# Patient Record
Sex: MALE | Race: WHITE | NOT HISPANIC OR LATINO | Employment: OTHER | URBAN - METROPOLITAN AREA
[De-identification: names, ages, dates, MRNs, and addresses within clinical notes are randomized per-mention and may not be internally consistent; named-entity substitution may affect disease eponyms.]

---

## 2022-09-12 ENCOUNTER — APPOINTMENT (OUTPATIENT)
Dept: CT IMAGING | Facility: CLINIC | Age: 67
End: 2022-09-12
Attending: NURSE PRACTITIONER
Payer: COMMERCIAL

## 2022-09-12 ENCOUNTER — HOSPITAL ENCOUNTER (EMERGENCY)
Facility: CLINIC | Age: 67
Discharge: HOME | End: 2022-09-12
Attending: NURSE PRACTITIONER | Admitting: NURSE PRACTITIONER
Payer: COMMERCIAL

## 2022-09-12 VITALS
TEMPERATURE: 98.4 F | RESPIRATION RATE: 20 BRPM | OXYGEN SATURATION: 94 % | DIASTOLIC BLOOD PRESSURE: 69 MMHG | HEART RATE: 58 BPM | WEIGHT: 300 LBS | SYSTOLIC BLOOD PRESSURE: 109 MMHG

## 2022-09-12 DIAGNOSIS — K61.1 PERIRECTAL ABSCESS: ICD-10-CM

## 2022-09-12 LAB
ANION GAP SERPL CALCULATED.3IONS-SCNC: 11 MMOL/L (ref 7–15)
BASOPHILS # BLD AUTO: 0.1 10E3/UL (ref 0–0.2)
BASOPHILS NFR BLD AUTO: 1 %
BUN SERPL-MCNC: 15.6 MG/DL (ref 8–23)
CALCIUM SERPL-MCNC: 9.6 MG/DL (ref 8.8–10.2)
CHLORIDE SERPL-SCNC: 99 MMOL/L (ref 98–107)
CREAT SERPL-MCNC: 0.96 MG/DL (ref 0.67–1.17)
DEPRECATED HCO3 PLAS-SCNC: 27 MMOL/L (ref 22–29)
EOSINOPHIL # BLD AUTO: 0.2 10E3/UL (ref 0–0.7)
EOSINOPHIL NFR BLD AUTO: 2 %
ERYTHROCYTE [DISTWIDTH] IN BLOOD BY AUTOMATED COUNT: 14.3 % (ref 10–15)
GFR SERPL CREATININE-BSD FRML MDRD: 87 ML/MIN/1.73M2
GLUCOSE SERPL-MCNC: 88 MG/DL (ref 70–99)
HCT VFR BLD AUTO: 52.3 % (ref 40–53)
HGB BLD-MCNC: 16.7 G/DL (ref 13.3–17.7)
IMM GRANULOCYTES # BLD: 0.1 10E3/UL
IMM GRANULOCYTES NFR BLD: 1 %
LYMPHOCYTES # BLD AUTO: 1.5 10E3/UL (ref 0.8–5.3)
LYMPHOCYTES NFR BLD AUTO: 13 %
MCH RBC QN AUTO: 29.9 PG (ref 26.5–33)
MCHC RBC AUTO-ENTMCNC: 31.9 G/DL (ref 31.5–36.5)
MCV RBC AUTO: 94 FL (ref 78–100)
MONOCYTES # BLD AUTO: 0.9 10E3/UL (ref 0–1.3)
MONOCYTES NFR BLD AUTO: 7 %
NEUTROPHILS # BLD AUTO: 9 10E3/UL (ref 1.6–8.3)
NEUTROPHILS NFR BLD AUTO: 76 %
NRBC # BLD AUTO: 0 10E3/UL
NRBC BLD AUTO-RTO: 0 /100
PLATELET # BLD AUTO: 227 10E3/UL (ref 150–450)
POTASSIUM SERPL-SCNC: 4.6 MMOL/L (ref 3.4–5.3)
RBC # BLD AUTO: 5.58 10E6/UL (ref 4.4–5.9)
SODIUM SERPL-SCNC: 137 MMOL/L (ref 136–145)
WBC # BLD AUTO: 11.8 10E3/UL (ref 4–11)

## 2022-09-12 PROCEDURE — 87077 CULTURE AEROBIC IDENTIFY: CPT | Performed by: NURSE PRACTITIONER

## 2022-09-12 PROCEDURE — 99284 EMERGENCY DEPT VISIT MOD MDM: CPT | Mod: 25 | Performed by: NURSE PRACTITIONER

## 2022-09-12 PROCEDURE — 85004 AUTOMATED DIFF WBC COUNT: CPT | Performed by: NURSE PRACTITIONER

## 2022-09-12 PROCEDURE — 10060 I&D ABSCESS SIMPLE/SINGLE: CPT | Performed by: NURSE PRACTITIONER

## 2022-09-12 PROCEDURE — 72193 CT PELVIS W/DYE: CPT

## 2022-09-12 PROCEDURE — 250N000011 HC RX IP 250 OP 636: Performed by: NURSE PRACTITIONER

## 2022-09-12 PROCEDURE — 87070 CULTURE OTHR SPECIMN AEROBIC: CPT | Performed by: NURSE PRACTITIONER

## 2022-09-12 PROCEDURE — 250N000009 HC RX 250: Performed by: NURSE PRACTITIONER

## 2022-09-12 PROCEDURE — 36415 COLL VENOUS BLD VENIPUNCTURE: CPT | Performed by: NURSE PRACTITIONER

## 2022-09-12 PROCEDURE — 82310 ASSAY OF CALCIUM: CPT | Performed by: NURSE PRACTITIONER

## 2022-09-12 PROCEDURE — 99285 EMERGENCY DEPT VISIT HI MDM: CPT | Mod: 25 | Performed by: NURSE PRACTITIONER

## 2022-09-12 RX ORDER — IOPAMIDOL 755 MG/ML
100 INJECTION, SOLUTION INTRAVASCULAR ONCE
Status: COMPLETED | OUTPATIENT
Start: 2022-09-12 | End: 2022-09-12

## 2022-09-12 RX ORDER — OXYCODONE HYDROCHLORIDE 5 MG/1
5 TABLET ORAL EVERY 6 HOURS PRN
Qty: 12 TABLET | Refills: 0 | Status: SHIPPED | OUTPATIENT
Start: 2022-09-12 | End: 2022-09-15

## 2022-09-12 RX ORDER — DOXYCYCLINE 100 MG/1
100 CAPSULE ORAL 2 TIMES DAILY
Qty: 20 CAPSULE | Refills: 0 | Status: SHIPPED | OUTPATIENT
Start: 2022-09-12 | End: 2022-09-22

## 2022-09-12 RX ADMIN — SODIUM CHLORIDE 74 ML: 9 INJECTION, SOLUTION INTRAVENOUS at 17:54

## 2022-09-12 RX ADMIN — IOPAMIDOL 100 ML: 755 INJECTION, SOLUTION INTRAVENOUS at 17:54

## 2022-09-12 ASSESSMENT — ACTIVITIES OF DAILY LIVING (ADL)
ADLS_ACUITY_SCORE: 35
ADLS_ACUITY_SCORE: 35

## 2022-09-12 NOTE — ED PROVIDER NOTES
History     Chief Complaint   Patient presents with     Cyst     On buttocks. X 2 weeks. States he has several.      HPI  Cleveland Rodgers is a 67 year old male with history of hypertension, T2DM, obstructive sleep apnea, morbid obesity, moderate persistent asthma, and hyperlipidemia who presents for evaluation of right buttock abscess. Symptoms started 2 weeks ago. He has been trying warm compresses without improvement.  Denies fever or chills.  Reports pain making it difficult to sit down.    Patient said prior perirectal abscess that were drained and packed several years ago, none recently.     Allergies:  No Known Allergies    Problem List:    There are no problems to display for this patient.       Past Medical History:    No past medical history on file.    Past Surgical History:    No past surgical history on file.    Family History:    No family history on file.    Social History:  Marital Status:   [2]        Medications:    amoxicillin-clavulanate (AUGMENTIN) 875-125 MG tablet  doxycycline hyclate (VIBRAMYCIN) 100 MG capsule  oxyCODONE (ROXICODONE) 5 MG tablet          Review of Systems  As mentioned above in the history present illness. All other systems were reviewed and are negative.    Physical Exam   BP: 109/69  Pulse: 58  Temp: 98.4  F (36.9  C)  Resp: 20  Weight: 136.1 kg (300 lb)  SpO2: 94 %      Physical Exam  Constitutional:       General: He is not in acute distress.     Appearance: He is obese. He is not ill-appearing.   HENT:      Head: Normocephalic and atraumatic.      Nose: Nose normal.      Mouth/Throat:      Pharynx: Oropharynx is clear.   Eyes:      Conjunctiva/sclera: Conjunctivae normal.   Cardiovascular:      Rate and Rhythm: Normal rate and regular rhythm.      Heart sounds: No murmur heard.  Pulmonary:      Effort: Pulmonary effort is normal.      Breath sounds: Normal breath sounds.   Genitourinary:     Rectum: Tenderness (right perirectal) present.       Neurological:       "General: No focal deficit present.      Mental Status: He is alert and oriented to person, place, and time.         ED Course                 [facility]    PROCEDURE: -Incision/Drainage    Date/Time: 9/12/2022 6:30 PM  Performed by: Afua Wu APRN CNP  Authorized by: Afua Wu APRN CNP     Risks, benefits and alternatives discussed.      LOCATION:      Type:  Abscess    Size:  4cm    PRE-PROCEDURE DETAILS:     Skin preparation:  Betadine    PROCEDURE TYPE:     Complexity:  Simple    ANESTHESIA (see MAR for exact dosages):     Anesthesia method:  Local infiltration    Local anesthetic:  Bupivacaine 0.5% WITH epi    PROCEDURE DETAILS:     Needle aspiration: no      Incision types:  Cruciate    Incision depth:  Dermal    Scalpel blade:  11    Wound management:  Probed and deloculated and irrigated with saline    Drainage:  Purulent    Drainage amount:  Copious    Packing material: 1\" packing placed.    PROCEDURE  Describe Procedure: Cruciate incision made but only bloody drainage present.   I consulted with Dr. Jose A Moore,emergency physician who came to the bedside. Additional local anesthetic applied. Ultrasound used and I was able to visualize the abscess which was superior to the incision I made. I was able to do a single stab incision into the existing incision and this resulted in copious purulent drainage. Hemostat used abscess probed and deloculated. The wound was irrigated with normal saline.    Patient Tolerance:  Patient tolerated the procedure well with no immediate complications                Results for orders placed or performed during the hospital encounter of 09/12/22 (from the past 24 hour(s))   CBC with platelets differential    Narrative    The following orders were created for panel order CBC with platelets differential.  Procedure                               Abnormality         Status                     ---------                         "       -----------         ------                     CBC with platelets and d...[[id number]]  Abnormal            Final result                 Please view results for these tests on the individual orders.   Basic metabolic panel   Result Value Ref Range    Creatinine 0.96 0.67 - 1.17 mg/dL    Sodium 137 136 - 145 mmol/L    Potassium 4.6 3.4 - 5.3 mmol/L    Urea Nitrogen 15.6 8.0 - 23.0 mg/dL    Chloride 99 98 - 107 mmol/L    Carbon Dioxide (CO2) 27 22 - 29 mmol/L    Anion Gap 11 7 - 15 mmol/L    Glucose 88 70 - 99 mg/dL    GFR Estimate 87 >60 mL/min/1.73m2    Calcium 9.6 8.8 - 10.2 mg/dL   CBC with platelets and differential   Result Value Ref Range    WBC Count 11.8 (H) 4.0 - 11.0 10e3/uL    RBC Count 5.58 4.40 - 5.90 10e6/uL    Hemoglobin 16.7 13.3 - 17.7 g/dL    Hematocrit 52.3 40.0 - 53.0 %    MCV 94 78 - 100 fL    MCH 29.9 26.5 - 33.0 pg    MCHC 31.9 31.5 - 36.5 g/dL    RDW 14.3 10.0 - 15.0 %    Platelet Count 227 150 - 450 10e3/uL    % Neutrophils 76 %    % Lymphocytes 13 %    % Monocytes 7 %    % Eosinophils 2 %    % Basophils 1 %    % Immature Granulocytes 1 %    NRBCs per 100 WBC 0 <1 /100    Absolute Neutrophils 9.0 (H) 1.6 - 8.3 10e3/uL    Absolute Lymphocytes 1.5 0.8 - 5.3 10e3/uL    Absolute Monocytes 0.9 0.0 - 1.3 10e3/uL    Absolute Eosinophils 0.2 0.0 - 0.7 10e3/uL    Absolute Basophils 0.1 0.0 - 0.2 10e3/uL    Absolute Immature Granulocytes 0.1 <=0.4 10e3/uL    Absolute NRBCs 0.0 10e3/uL   CT Pelvis Soft Tissue w Contrast    Narrative    EXAM: CT PELVIS SOFT TISSUE W CONTRAST  LOCATION: [facility]  DATE/TIME: 9/12/2022 6:03 PM    INDICATION: perirectal abscess (right)  COMPARISON: None.  TECHNIQUE: CT scan of the pelvis was performed with IV contrast. Multiplanar reformats were obtained. Dose reduction techniques were used.  CONTRAST: 100mL Isovue 370    FINDINGS:    PELVIC ORGANS: A 0.8 cm nephrolith lower pole right kidney. A 4.0 x 2.0 cm right perianal abscess, image  136:2. The remaining visualized bowel is unremarkable. A small fat-containing umbilical hernia.    MUSCULOSKELETAL: No suspicious lesion.      Impression    IMPRESSION:  1.  A 4.0 cm right perianal abscess.         Medications   iopamidol (ISOVUE-370) solution 100 mL (100 mLs Intravenous Given 9/12/22 1754)   sodium chloride 0.9 % bag 500mL for CT scan flush use (74 mLs As instructed Given 9/12/22 1754)        4:09 PM I consulted with Dr. Leong, on-call surgeon. We discuss option of drainage in the OR later this evening vs incision and drainage here by me in the ED, place packing and she can see patient for follow-up in clinic on Wednesday. Recommends getting soft tissue pelvis CT.    ---We decided that I will get the CT, I & D the abscess, pack it and patient will see Dr. Leong in clinic on Wednesday.  5:15 PM patient still waiting for CT (busy ED and there is a back-up for patient's waiting for CT).       Assessments & Plan (with Medical Decision Making)  Leave packing in place until you see Dr. Leong (surgeon) on Wednesday.   Augmentin 875 mg twice a day for 10 days.  Doxycyline 100 mg twice a day for 10 days.  Tylenol 650 mg every 4-6 hours as needed for pain.  Ibuprofen 400-600 mg every 6-8 hours as needed for pain  (take with food, stop if causing stomach pains.)  Oxycodone 5 mg every 6 hours as needed for moderate/severe pain.  Follow-up with Dr. Leong on Wednesday, they will call you to let you know what time the appointment on Wednesday will be.  Return to the emergency department for fevers, increased swelling, or any other symptoms of concern.       Discharge Medication List as of 9/12/2022  7:03 PM      START taking these medications    Details   amoxicillin-clavulanate (AUGMENTIN) 875-125 MG tablet Take 1 tablet by mouth 2 times daily for 10 days, Disp-20 tablet, R-0, E-Prescribe      doxycycline hyclate (VIBRAMYCIN) 100 MG capsule Take 1 capsule (100 mg) by mouth 2 times daily for 10 days, Disp-20 capsule,  R-0, E-Prescribe      oxyCODONE (ROXICODONE) 5 MG tablet Take 1 tablet (5 mg) by mouth every 6 hours as needed for pain, Disp-12 tablet, R-0, E-Prescribe             Final diagnoses:   Perirectal abscess - right       9/12/2022   [facility] EMERGENCY DEPT     Bryce, RONDA Cee CNP  09/12/22 [id number]

## 2022-09-12 NOTE — DISCHARGE INSTRUCTIONS
Leave packing in place until you see Dr. Leong (surgeon) on Wednesday.   Augmentin 875 mg twice a day for 10 days.  Doxycyline 100 mg twice a day for 10 days.  Tylenol 650 mg every 4-6 hours as needed for pain.  Ibuprofen 400-600 mg every 6-8 hours as needed for pain  (take with food, stop if causing stomach pains.)  Oxycodone 5 mg every 6 hours as needed for moderate/severe pain.  Follow-up with Dr. Leong on Wednesday, they will call you to let you know what time the appointment on Wednesday will be.  Return to the emergency department for fevers, increased swelling, or any other symptoms of concern.

## 2022-09-16 LAB
BACTERIA ABSC ANAEROBE+AEROBE CULT: ABNORMAL
BACTERIA ABSC ANAEROBE+AEROBE CULT: ABNORMAL

## 2022-09-28 ENCOUNTER — HOSPITAL ENCOUNTER (EMERGENCY)
Facility: CLINIC | Age: 67
Discharge: HOME | End: 2022-09-28
Attending: FAMILY MEDICINE | Admitting: FAMILY MEDICINE
Payer: COMMERCIAL

## 2022-09-28 ENCOUNTER — APPOINTMENT (OUTPATIENT)
Dept: CT IMAGING | Facility: CLINIC | Age: 67
End: 2022-09-28
Attending: NURSE PRACTITIONER
Payer: COMMERCIAL

## 2022-09-28 VITALS
WEIGHT: 295 LBS | SYSTOLIC BLOOD PRESSURE: 127 MMHG | DIASTOLIC BLOOD PRESSURE: 82 MMHG | BODY MASS INDEX: 44.71 KG/M2 | HEIGHT: 68 IN | TEMPERATURE: 98.6 F | HEART RATE: 90 BPM | OXYGEN SATURATION: 96 % | RESPIRATION RATE: 20 BRPM

## 2022-09-28 DIAGNOSIS — N49.2 SCROTAL ABSCESS: ICD-10-CM

## 2022-09-28 PROBLEM — F10.90 ALCOHOL INTAKE ABOVE RECOMMENDED SENSIBLE LIMITS: Status: ACTIVE | Noted: 2022-09-28

## 2022-09-28 PROBLEM — E11.9 TYPE 2 DIABETES MELLITUS WITHOUT COMPLICATIONS (H): Status: ACTIVE | Noted: 2022-09-28

## 2022-09-28 PROBLEM — M54.50 LOW BACK PAIN: Status: ACTIVE | Noted: 2022-09-28

## 2022-09-28 PROBLEM — Z99.89 CPAP (CONTINUOUS POSITIVE AIRWAY PRESSURE) DEPENDENCE: Status: ACTIVE | Noted: 2018-02-28

## 2022-09-28 PROBLEM — H26.9 CATARACT: Status: ACTIVE | Noted: 2017-01-24

## 2022-09-28 PROBLEM — E78.5 HYPERLIPIDEMIA: Status: ACTIVE | Noted: 2022-09-28

## 2022-09-28 PROBLEM — E11.9 DIABETES MELLITUS (H): Status: ACTIVE | Noted: 2022-09-28

## 2022-09-28 LAB
ALBUMIN UR-MCNC: NEGATIVE MG/DL
ANION GAP SERPL CALCULATED.3IONS-SCNC: 8 MMOL/L (ref 7–15)
APPEARANCE UR: CLEAR
BASOPHILS # BLD AUTO: 0.1 10E3/UL (ref 0–0.2)
BASOPHILS NFR BLD AUTO: 1 %
BILIRUB UR QL STRIP: NEGATIVE
BUN SERPL-MCNC: 10.1 MG/DL (ref 8–23)
CALCIUM SERPL-MCNC: 8.7 MG/DL (ref 8.8–10.2)
CHLORIDE SERPL-SCNC: 103 MMOL/L (ref 98–107)
COLOR UR AUTO: YELLOW
CREAT SERPL-MCNC: 0.87 MG/DL (ref 0.67–1.17)
DEPRECATED HCO3 PLAS-SCNC: 26 MMOL/L (ref 22–29)
EOSINOPHIL # BLD AUTO: 0.2 10E3/UL (ref 0–0.7)
EOSINOPHIL NFR BLD AUTO: 2 %
ERYTHROCYTE [DISTWIDTH] IN BLOOD BY AUTOMATED COUNT: 14.4 % (ref 10–15)
GFR SERPL CREATININE-BSD FRML MDRD: >90 ML/MIN/1.73M2
GLUCOSE SERPL-MCNC: 109 MG/DL (ref 70–99)
GLUCOSE UR STRIP-MCNC: >499 MG/DL
HCT VFR BLD AUTO: 47 % (ref 40–53)
HGB BLD-MCNC: 14.9 G/DL (ref 13.3–17.7)
HGB UR QL STRIP: NEGATIVE
HOLD SPECIMEN: NORMAL
IMM GRANULOCYTES # BLD: 0 10E3/UL
IMM GRANULOCYTES NFR BLD: 0 %
KETONES UR STRIP-MCNC: 5 MG/DL
LACTATE SERPL-SCNC: 0.9 MMOL/L (ref 0.7–2)
LEUKOCYTE ESTERASE UR QL STRIP: NEGATIVE
LYMPHOCYTES # BLD AUTO: 1.1 10E3/UL (ref 0.8–5.3)
LYMPHOCYTES NFR BLD AUTO: 11 %
MCH RBC QN AUTO: 30 PG (ref 26.5–33)
MCHC RBC AUTO-ENTMCNC: 31.7 G/DL (ref 31.5–36.5)
MCV RBC AUTO: 95 FL (ref 78–100)
MONOCYTES # BLD AUTO: 0.9 10E3/UL (ref 0–1.3)
MONOCYTES NFR BLD AUTO: 9 %
NEUTROPHILS # BLD AUTO: 7.6 10E3/UL (ref 1.6–8.3)
NEUTROPHILS NFR BLD AUTO: 77 %
NITRATE UR QL: NEGATIVE
NRBC # BLD AUTO: 0 10E3/UL
NRBC BLD AUTO-RTO: 0 /100
PH UR STRIP: 5 [PH] (ref 5–7)
PLATELET # BLD AUTO: 200 10E3/UL (ref 150–450)
POTASSIUM SERPL-SCNC: 4.4 MMOL/L (ref 3.4–5.3)
RBC # BLD AUTO: 4.97 10E6/UL (ref 4.4–5.9)
RBC URINE: 0 /HPF
SODIUM SERPL-SCNC: 137 MMOL/L (ref 136–145)
SP GR UR STRIP: 1.01 (ref 1–1.03)
UROBILINOGEN UR STRIP-MCNC: NORMAL MG/DL
WBC # BLD AUTO: 9.9 10E3/UL (ref 4–11)
WBC URINE: 1 /HPF

## 2022-09-28 PROCEDURE — 96374 THER/PROPH/DIAG INJ IV PUSH: CPT | Performed by: NURSE PRACTITIONER

## 2022-09-28 PROCEDURE — 99285 EMERGENCY DEPT VISIT HI MDM: CPT | Mod: 25 | Performed by: NURSE PRACTITIONER

## 2022-09-28 PROCEDURE — 10060 I&D ABSCESS SIMPLE/SINGLE: CPT | Performed by: NURSE PRACTITIONER

## 2022-09-28 PROCEDURE — 96361 HYDRATE IV INFUSION ADD-ON: CPT | Performed by: NURSE PRACTITIONER

## 2022-09-28 PROCEDURE — 96375 TX/PRO/DX INJ NEW DRUG ADDON: CPT | Performed by: NURSE PRACTITIONER

## 2022-09-28 PROCEDURE — 87040 BLOOD CULTURE FOR BACTERIA: CPT | Performed by: NURSE PRACTITIONER

## 2022-09-28 PROCEDURE — 36415 COLL VENOUS BLD VENIPUNCTURE: CPT | Performed by: NURSE PRACTITIONER

## 2022-09-28 PROCEDURE — 250N000011 HC RX IP 250 OP 636: Performed by: NURSE PRACTITIONER

## 2022-09-28 PROCEDURE — 81001 URINALYSIS AUTO W/SCOPE: CPT | Performed by: NURSE PRACTITIONER

## 2022-09-28 PROCEDURE — 80048 BASIC METABOLIC PNL TOTAL CA: CPT | Performed by: NURSE PRACTITIONER

## 2022-09-28 PROCEDURE — 250N000009 HC RX 250: Performed by: NURSE PRACTITIONER

## 2022-09-28 PROCEDURE — 83605 ASSAY OF LACTIC ACID: CPT | Performed by: NURSE PRACTITIONER

## 2022-09-28 PROCEDURE — 85025 COMPLETE CBC W/AUTO DIFF WBC: CPT | Performed by: NURSE PRACTITIONER

## 2022-09-28 PROCEDURE — 72193 CT PELVIS W/DYE: CPT

## 2022-09-28 PROCEDURE — 258N000003 HC RX IP 258 OP 636: Performed by: NURSE PRACTITIONER

## 2022-09-28 RX ORDER — ATENOLOL 50 MG/1
1 TABLET ORAL DAILY
COMMUNITY
Start: 2022-07-05

## 2022-09-28 RX ORDER — HYDROCODONE BITARTRATE AND ACETAMINOPHEN 5; 325 MG/1; MG/1
1 TABLET ORAL EVERY 6 HOURS PRN
Qty: 10 TABLET | Refills: 0 | Status: SHIPPED | OUTPATIENT
Start: 2022-09-28 | End: 2022-10-01

## 2022-09-28 RX ORDER — ASPIRIN 81 MG/1
TABLET, CHEWABLE ORAL
COMMUNITY

## 2022-09-28 RX ORDER — ONDANSETRON 2 MG/ML
4 INJECTION INTRAMUSCULAR; INTRAVENOUS EVERY 30 MIN PRN
Status: DISCONTINUED | OUTPATIENT
Start: 2022-09-28 | End: 2022-09-28 | Stop reason: HOSPADM

## 2022-09-28 RX ORDER — BUDESONIDE AND FORMOTEROL FUMARATE DIHYDRATE 160; 4.5 UG/1; UG/1
AEROSOL RESPIRATORY (INHALATION)
COMMUNITY
Start: 2022-07-05

## 2022-09-28 RX ORDER — PREDNISONE 20 MG/1
40 TABLET ORAL
COMMUNITY
Start: 2022-05-18

## 2022-09-28 RX ORDER — HYDROMORPHONE HYDROCHLORIDE 1 MG/ML
0.5 INJECTION, SOLUTION INTRAMUSCULAR; INTRAVENOUS; SUBCUTANEOUS
Status: DISCONTINUED | OUTPATIENT
Start: 2022-09-28 | End: 2022-09-28 | Stop reason: HOSPADM

## 2022-09-28 RX ORDER — CETIRIZINE HYDROCHLORIDE 10 MG/1
1 TABLET ORAL DAILY
COMMUNITY
Start: 2021-12-10

## 2022-09-28 RX ORDER — LISINOPRIL 40 MG/1
0.5 TABLET ORAL DAILY
COMMUNITY
Start: 2022-07-05

## 2022-09-28 RX ORDER — IOPAMIDOL 755 MG/ML
100 INJECTION, SOLUTION INTRAVASCULAR ONCE
Status: COMPLETED | OUTPATIENT
Start: 2022-09-28 | End: 2022-09-28

## 2022-09-28 RX ORDER — ATORVASTATIN CALCIUM 40 MG/1
20 TABLET, FILM COATED ORAL
COMMUNITY
Start: 2022-01-05

## 2022-09-28 RX ORDER — SODIUM CHLORIDE, SODIUM LACTATE, POTASSIUM CHLORIDE, CALCIUM CHLORIDE 600; 310; 30; 20 MG/100ML; MG/100ML; MG/100ML; MG/100ML
125 INJECTION, SOLUTION INTRAVENOUS CONTINUOUS
Status: DISCONTINUED | OUTPATIENT
Start: 2022-09-28 | End: 2022-09-28 | Stop reason: HOSPADM

## 2022-09-28 RX ORDER — KETOROLAC TROMETHAMINE 15 MG/ML
10 INJECTION, SOLUTION INTRAMUSCULAR; INTRAVENOUS ONCE
Status: COMPLETED | OUTPATIENT
Start: 2022-09-28 | End: 2022-09-28

## 2022-09-28 RX ADMIN — SODIUM CHLORIDE, POTASSIUM CHLORIDE, SODIUM LACTATE AND CALCIUM CHLORIDE 1000 ML: 600; 310; 30; 20 INJECTION, SOLUTION INTRAVENOUS at 10:55

## 2022-09-28 RX ADMIN — SODIUM CHLORIDE 74 ML: 9 INJECTION, SOLUTION INTRAVENOUS at 11:58

## 2022-09-28 RX ADMIN — ONDANSETRON 4 MG: 2 INJECTION INTRAMUSCULAR; INTRAVENOUS at 10:55

## 2022-09-28 RX ADMIN — HYDROMORPHONE HYDROCHLORIDE 0.5 MG: 1 INJECTION, SOLUTION INTRAMUSCULAR; INTRAVENOUS; SUBCUTANEOUS at 10:58

## 2022-09-28 RX ADMIN — IOPAMIDOL 100 ML: 755 INJECTION, SOLUTION INTRAVENOUS at 11:58

## 2022-09-28 RX ADMIN — KETOROLAC TROMETHAMINE 10 MG: 15 INJECTION, SOLUTION INTRAMUSCULAR; INTRAVENOUS at 10:56

## 2022-09-28 ASSESSMENT — ACTIVITIES OF DAILY LIVING (ADL)
ADLS_ACUITY_SCORE: 35
ADLS_ACUITY_SCORE: 35

## 2022-09-28 NOTE — ED TRIAGE NOTES
Pt here with boils on his scrotum that need lancing. Pt had similar boils lanced near his buttocks a few weeks ago. Pt stood in triage for assessment.      Triage Assessment     Row Name 09/28/22 0848       Triage Assessment (Adult)    Airway WDL WDL       Respiratory WDL    Respiratory WDL WDL       Cardiac WDL    Cardiac WDL WDL       Cognitive/Neuro/Behavioral WDL    Cognitive/Neuro/Behavioral WDL WDL

## 2022-09-28 NOTE — ED PROVIDER NOTES
History     Chief Complaint   Patient presents with     Cyst     Boils on scrotum     HPI  Cleveland Rodgers is a 67 year old male with past medical history of diabetes, hyperlipidemia, sleep apnea, moderate persistent asthma, morbid obesity, tobacco use who presents to the emergency department with 1 week history of testicular cyst with associated pain, swelling.  Patient recently seen on September 20 for a perirectal abscess that was incised and drained, had a CT that revealed a 4 cm abscess.  Consultation with surgeon was completed and patient was to follow-up in 2 days but states he was feeling better so he did not complete his follow-up exam.  Currently patient states he is having intermittent chills and severe pain that he rates 8-9 out of 10.  Patient also endorses dysuria and intermittent urinary stream.  Patient states that he is taken Tylenol and ibuprofen without improvement in symptoms for his pain management.  Patient denies mental confusion, abdominal pain, bright red rectal bleeding, hematuria, change in bowel habits, chest pain.    Allergies:  Allergies   Allergen Reactions     Lisinopril Headache       Problem List:    Patient Active Problem List    Diagnosis Date Noted     Alcohol intake above recommended sensible limits 09/28/2022     Priority: Medium     Diabetes mellitus (H) 09/28/2022     Priority: Medium     Hyperlipidemia 09/28/2022     Priority: Medium     Low back pain 09/28/2022     Priority: Medium     Type 2 diabetes mellitus without complications (H) 09/28/2022     Priority: Medium     CPAP (continuous positive airway pressure) dependence 02/28/2018     Priority: Medium     Formatting of this note might be different from the original.  CPAP 17 cmH2O       Cataract 01/24/2017     Priority: Medium     Moderate persistent asthma with acute exacerbation 08/21/2016     Priority: Medium     Morbid obesity (H) 06/03/2013     Priority: Medium     Acute bronchitis 10/05/2008     Priority: Medium  "    Tobacco use disorder 03/24/2008     Priority: Medium     Essential hypertension 05/02/2006     Priority: Medium     Formatting of this note might be different from the original.  Epic          Past Medical History:    History reviewed. No pertinent past medical history.    Past Surgical History:    History reviewed. No pertinent surgical history.    Family History:    History reviewed. No pertinent family history.    Social History:  Marital Status:   [2]        Medications:    amoxicillin-clavulanate (AUGMENTIN) 875-125 MG tablet  atenolol (TENORMIN) 50 MG tablet  atorvastatin (LIPITOR) 40 MG tablet  budesonide-formoterol (SYMBICORT) 160-4.5 MCG/ACT Inhaler  cetirizine (ZYRTEC) 10 MG tablet  empagliflozin (JARDIANCE) 25 MG TABS tablet  lisinopril (ZESTRIL) 40 MG tablet  predniSONE (DELTASONE) 20 MG tablet  aspirin (ASA) 81 MG chewable tablet      Review of Systems  As mentioned above in the history present illness. All other systems were reviewed and are negative.    Physical Exam   BP: 127/82  Pulse: 90  Temp: 98.6  F (37  C)  Resp: 20  Height: 172.7 cm (5' 8\")  Weight: 133.8 kg (295 lb)  SpO2: 97 %      Physical Exam  Vitals and nursing note reviewed.   Constitutional:       General: He is not in acute distress.     Appearance: He is well-developed. He is not diaphoretic.   HENT:      Head: Normocephalic and atraumatic.      Right Ear: Hearing and external ear normal.      Left Ear: Hearing and external ear normal.      Nose: Nose normal.   Eyes:      General: No scleral icterus.        Right eye: No discharge.         Left eye: No discharge.      Conjunctiva/sclera: Conjunctivae normal.   Cardiovascular:      Rate and Rhythm: Normal rate and regular rhythm.      Heart sounds: Normal heart sounds. No murmur heard.    No friction rub. No gallop.   Pulmonary:      Effort: Pulmonary effort is normal. No respiratory distress.      Breath sounds: Normal breath sounds. No stridor. No wheezing or rales. "   Genitourinary:      Musculoskeletal:         General: No tenderness.   Skin:     General: Skin is warm.      Capillary Refill: Capillary refill takes less than 2 seconds.      Findings: No rash.   Neurological:      Mental Status: He is alert and oriented to person, place, and time.   Psychiatric:         Behavior: Behavior is cooperative.             ED Course              ED Course as of 09/28/22 1432   Wed Sep 28, 2022   1127 Consultation with Dr. Perdomo via phone, general surgeon discussed case as patient had previously been in on September 12.  He recommends rule out Anabel's gangrene and if needs treatment would need to seek care at a higher level of care facility due to complex medical condition     [facility]    PROCEDURE: -Incision/Drainage    Date/Time: 9/28/2022 1:45 PM  Performed by: Brenda Barraza APRN CNP  Authorized by: Brenda Barraza APRN CNP     Risks, benefits and alternatives discussed.      LOCATION:      Type:  Abscess    Size:  7 cm by 2 cm    Location:  Anogenital    Anogenital location:  Scrotal space    PRE-PROCEDURE DETAILS:     Skin preparation:  Betadine    PROCEDURE TYPE:     Complexity:  Simple    ANESTHESIA (see MAR for exact dosages):     Anesthesia method:  Local infiltration    Local anesthetic:  Lidocaine 1% WITH epi    PROCEDURE DETAILS:     Needle aspiration: no      Incision types:  Single straight    Incision depth:  Dermal    Scalpel blade:  11    Wound management:  Probed and deloculated, irrigated with saline and extensive cleaning    Drainage:  Purulent and bloody    Drainage amount:  Moderate    Wound treatment:  Wound left open    Packing materials:  None    PROCEDURE    Patient Tolerance:  Patient tolerated the procedure well with no immediate complications      Results for orders placed or performed during the hospital encounter of 09/28/22 (from the past 24 hour(s))   CBC with platelets differential    Narrative    The  following orders were created for panel order CBC with platelets differential.  Procedure                               Abnormality         Status                     ---------                               -----------         ------                     CBC with platelets and d...[[id number]]                      Final result                 Please view results for these tests on the individual orders.   Basic metabolic panel   Result Value Ref Range    Sodium 137 136 - 145 mmol/L    Potassium 4.4 3.4 - 5.3 mmol/L    Chloride 103 98 - 107 mmol/L    Carbon Dioxide (CO2) 26 22 - 29 mmol/L    Anion Gap 8 7 - 15 mmol/L    Urea Nitrogen 10.1 8.0 - 23.0 mg/dL    Creatinine 0.87 0.67 - 1.17 mg/dL    Calcium 8.7 (L) 8.8 - 10.2 mg/dL    Glucose 109 (H) 70 - 99 mg/dL    GFR Estimate >90 >60 mL/min/1.73m2   Lactic acid whole blood   Result Value Ref Range    Lactic Acid 0.9 0.7 - 2.0 mmol/L   [place] Draw    Narrative    The following orders were created for panel order [place] Draw.  Procedure                               Abnormality         Status                     ---------                               -----------         ------                     Extra Blue Top Tube[[id number]]                              Final result               Extra Red Top Tube[[id number]]                                                            Please view results for these tests on the individual orders.   CBC with platelets and differential   Result Value Ref Range    WBC Count 9.9 4.0 - 11.0 10e3/uL    RBC Count 4.97 4.40 - 5.90 10e6/uL    Hemoglobin 14.9 13.3 - 17.7 g/dL    Hematocrit 47.0 40.0 - 53.0 %    MCV 95 78 - 100 fL    MCH 30.0 26.5 - 33.0 pg    MCHC 31.7 31.5 - 36.5 g/dL    RDW 14.4 10.0 - 15.0 %    Platelet Count 200 150 - 450 10e3/uL    % Neutrophils 77 %    % Lymphocytes 11 %    % Monocytes 9 %    % Eosinophils 2 %    % Basophils 1 %    % Immature Granulocytes 0 %    NRBCs per 100 WBC 0 <1 /100    Absolute Neutrophils 7.6 1.6 -  8.3 10e3/uL    Absolute Lymphocytes 1.1 0.8 - 5.3 10e3/uL    Absolute Monocytes 0.9 0.0 - 1.3 10e3/uL    Absolute Eosinophils 0.2 0.0 - 0.7 10e3/uL    Absolute Basophils 0.1 0.0 - 0.2 10e3/uL    Absolute Immature Granulocytes 0.0 <=0.4 10e3/uL    Absolute NRBCs 0.0 10e3/uL   Extra Blue Top Tube   Result Value Ref Range    Hold Specimen JIC    UA with Microscopic reflex to Culture    Specimen: Urine, Clean Catch   Result Value Ref Range    Color Urine Yellow Colorless, Straw, Light Yellow, Yellow    Appearance Urine Clear Clear    Glucose Urine >499 (A) Negative mg/dL    Bilirubin Urine Negative Negative    Ketones Urine 5  (A) Negative mg/dL    Specific Gravity Urine 1.015 1.003 - 1.035    Blood Urine Negative Negative    pH Urine 5.0 5.0 - 7.0    Protein Albumin Urine Negative Negative mg/dL    Urobilinogen Urine Normal Normal, 2.0 mg/dL    Nitrite Urine Negative Negative    Leukocyte Esterase Urine Negative Negative    RBC Urine 0 <=2 /HPF    WBC Urine 1 <=5 /HPF    Narrative    Urine Culture not indicated   CT Pelvis Soft Tissue w Contrast    Narrative    CT PELVIS SOFT TISSUE WITH CONTRAST September 28, 2022 12:14 PM    HISTORY: Scrotal pain, rule out Anabel's gangrene.    TECHNIQUE: CT scan obtained of the pelvis with IV contrast. 100mL of  Isovue 370 IV injected. Radiation dose for this scan was reduced using  automated exposure control, adjustment of the mA and/or kV according  to patient size, or iterative reconstruction technique.    COMPARISON: Pelvic CT on 9/12/2022.    FINDINGS:    Pelvic organs: There is approximately 7.2 x 2.0 cm collection in the  left perineum/posterior aspect of the scrotal sac, worrisome for an  abscess. In addition, there is interval decrease in the size of the  perianal abscess as compared to 9/12/2022 exam. Multiple mildly  enlarged inguinal lymph nodes, likely reactive. Multiple kidney stones  seen at the lower pole of right kidney.    Bones and soft tissue: No suspicious  "osseous lesion.      Impression    IMPRESSION:   1. There is approximately 7.2 x 2.0 cm fluid collection in the left  perineum/posterior aspect of the scrotal sac, worrisome for an  abscess.  2. Interval decrease in the previously seen perianal abscess as  compared to 9/12/2022 exam.  3. Right kidney stones.    YA WASHINGTON MD         SYSTEM ID:  [id number]       Medications   lactated ringers BOLUS 1,000 mL (0 mLs Intravenous Stopped 9/28/22 1431)     Followed by   lactated ringers infusion (has no administration in time range)   ondansetron (ZOFRAN) injection 4 mg (4 mg Intravenous Given 9/28/22 1055)   HYDROmorphone (PF) (DILAUDID) injection 0.5 mg (0.5 mg Intravenous Given 9/28/22 1058)   ketorolac (TORADOL) injection 10 mg (10 mg Intravenous Given 9/28/22 1056)   iopamidol (ISOVUE-370) solution 100 mL (100 mLs Intravenous Given 9/28/22 1158)   sodium chloride 0.9 % bag 500mL for CT scan flush use (74 mLs Intravenous Given 9/28/22 1158)       Assessments & Plan (with Medical Decision Making)     I have reviewed the nursing notes.    I have reviewed the findings, diagnosis, plan and need for follow up with the patient.  67-year-old male presents with left scrotal pain with report of onset of symptoms 1 week ago.  Patient reports a mass that is tender to touch and red and warm.  History is remarkable for perianal abscess incised and drained on September 12 with recommendation for follow-up surgeon.  Patient states he is feeling better so he did not follow-up with the surgeon.  Today patient denies any fevers.  He endorses \"possible pain with urination \"but denies hematuria.  On exam patient has a fluctuant mass difficult to discern on palpation whether it is 1 or 2 masses that are oval oblong shaped on the left side of the scrotum.  Ultrasound applied and appears to be possible communicating fluctuant mass.  CBC and blood cultures obtained to rule out any concern for Anabel's gangrene.  No evidence of " Anabel's gangrene.  Incision and drainage completed with ultrasound guidance, area was probed and deloculated with copious amounts of purulent drainage.  Packing not completed.  Patient started on Augmentin.  Recommended again that patient follow-up with general surgeon.  Appointment made for October 5.  Patient discharged in stable condition.    New Prescriptions    AMOXICILLIN-CLAVULANATE (AUGMENTIN) 875-125 MG TABLET    Take 1 tablet by mouth 2 times daily for 10 days       Final diagnoses:   Scrotal abscess       9/28/2022   [facility] EMERGENCY DEPT     Madi, Brenda Rod, APRN CNP  09/28/22 [id number]

## 2022-09-28 NOTE — DISCHARGE INSTRUCTIONS
Incision and drainage was completed today.  I recommend showering with the shower wand and spraying the scrotal area for 3 to 5 minutes every day in the shower.  Pat dry with towel then apply 4 x 4 to soak up any drainage.  Return if sudden worsening pain or recollection of fluid or you develop fever or chills or sweats.  Follow-up with general surgeon Dr. Leong on October 5.  Start Augmentin today and take 1 capsule by mouth twice daily.  Take 1 dose as soon as you  the medication and the second dose at bedtime.  Continue then by taking it twice daily.

## 2022-10-03 LAB
BACTERIA BLD CULT: NO GROWTH
BACTERIA BLD CULT: NO GROWTH

## 2023-07-23 ENCOUNTER — OFFICE VISIT (OUTPATIENT)
Dept: URGENT CARE | Facility: URGENT CARE | Age: 68
End: 2023-07-23
Payer: COMMERCIAL

## 2023-07-23 VITALS
BODY MASS INDEX: 44.09 KG/M2 | WEIGHT: 290 LBS | HEART RATE: 61 BPM | TEMPERATURE: 99.8 F | RESPIRATION RATE: 14 BRPM | DIASTOLIC BLOOD PRESSURE: 69 MMHG | SYSTOLIC BLOOD PRESSURE: 108 MMHG | OXYGEN SATURATION: 95 %

## 2023-07-23 DIAGNOSIS — K04.7 DENTAL ABSCESS: Primary | ICD-10-CM

## 2023-07-23 DIAGNOSIS — R22.0 FACIAL SWELLING: ICD-10-CM

## 2023-07-23 DIAGNOSIS — R51.9 FACIAL PAIN: ICD-10-CM

## 2023-07-23 PROCEDURE — 99203 OFFICE O/P NEW LOW 30 MIN: CPT | Performed by: NURSE PRACTITIONER

## 2023-07-23 NOTE — PROGRESS NOTES
Assessment & Plan     Dental abscess    - amoxicillin-clavulanate (AUGMENTIN) 875-125 MG tablet  Dispense: 14 tablet; Refill: 0    Facial swelling      Facial pain       Patient declines further evaluation in the emergency room where US is available to fully evaluate abscess. Discussed concern with temperature after ibuprofen as patient likely febrile and again patient declines and would like to treat with abx. Prescription sent to pharmacy for Augmentin twice daily for 7 days. Heat, ice, tylenol and ibuprofen as needed. Go to emergency room if symptoms worsen    Follow-up with PCP if symptoms persist for 3 days, and sooner if symptoms worsen or new symptoms develop.     Discussed red flag symptoms which warrant immediate visit in emergency room    All questions were answered and patient verbalized understanding. AVS reviewed with patient.     Ayaka José, DNP, APRN, CNP 7/23/2023 6:39 PM  [facility] [place]          Ramos Guthrie is a 68 year old male who presents to clinic today for the following health issues:  Chief Complaint   Patient presents with     Facial Swelling     Pt right cheek, Sx about day before yesterday ,Saturday  put a on compress on it and it burst got a mouth full of pus. Went down and now started filling back up again and can feel it in his sinus.      Patient presents for evaluation of right facial swelling. Swelling is from under right eye to top of right jaw. Symptoms started 2 days ago and have been worsening. Associated symptoms: redness, warmth, severe pain with touching. He took advil 2 hours ago which helps temporarily. Denies fever, cough, shortness of breath, teeth pain, ear pain. States he always has nasal congestion. Denies wheezing, emesis, tongue or lip swelling. Temp 99.8F currently.     Problem list, Medication list, Allergies, and Medical history reviewed in EPIC.    ROS:  Review of systems negative except for noted above        Objective    BP  108/69   Pulse 61   Temp 99.8  F (37.7  C) (Tympanic)   Resp 14   Wt 131.5 kg (290 lb)   SpO2 95%   BMI 44.09 kg/m    Physical Exam  Constitutional:       General: He is not in acute distress.     Appearance: He is not toxic-appearing or diaphoretic.   HENT:      Head:      Comments: Right cheek facial swelling with approx 4 cm firm abscess      Right Ear: Tympanic membrane, ear canal and external ear normal.      Left Ear: Tympanic membrane, ear canal and external ear normal.      Nose:      Comments: Mild nasal congestion     Mouth/Throat:      Comments: No angioedema currently. Right upper dental abscess with redness, swelling, tenderness with palpation  Cardiovascular:      Rate and Rhythm: Normal rate and regular rhythm.      Heart sounds: Normal heart sounds.   Pulmonary:      Effort: Pulmonary effort is normal. No respiratory distress.      Breath sounds: Normal breath sounds. No wheezing, rhonchi or rales.   Lymphadenopathy:      Cervical: No cervical adenopathy.   Skin:     General: Skin is warm and dry.      Findings: Erythema present.      Comments: Erythema throughout face    Neurological:      Mental Status: He is alert.

## 2023-07-24 ENCOUNTER — HOSPITAL ENCOUNTER (EMERGENCY)
Facility: CLINIC | Age: 68
Discharge: HOME | End: 2023-07-24
Attending: EMERGENCY MEDICINE | Admitting: EMERGENCY MEDICINE
Payer: COMMERCIAL

## 2023-07-24 VITALS
DIASTOLIC BLOOD PRESSURE: 88 MMHG | OXYGEN SATURATION: 98 % | RESPIRATION RATE: 18 BRPM | HEART RATE: 62 BPM | BODY MASS INDEX: 44.1 KG/M2 | HEIGHT: 68 IN | SYSTOLIC BLOOD PRESSURE: 127 MMHG | TEMPERATURE: 98.1 F | WEIGHT: 291 LBS

## 2023-07-24 DIAGNOSIS — K04.7 DENTAL ABSCESS: ICD-10-CM

## 2023-07-24 PROCEDURE — 41800 DRAINAGE OF GUM LESION: CPT | Performed by: EMERGENCY MEDICINE

## 2023-07-24 PROCEDURE — 99284 EMERGENCY DEPT VISIT MOD MDM: CPT | Mod: 25 | Performed by: EMERGENCY MEDICINE

## 2023-07-24 ASSESSMENT — ENCOUNTER SYMPTOMS
FACIAL SWELLING: 1
CHILLS: 0
FEVER: 0
SHORTNESS OF BREATH: 0
RHINORRHEA: 0
COUGH: 0
FATIGUE: 0
APPETITE CHANGE: 0

## 2023-07-24 ASSESSMENT — ACTIVITIES OF DAILY LIVING (ADL): ADLS_ACUITY_SCORE: 33

## 2023-07-24 NOTE — ED TRIAGE NOTES
Patient diagnosed with dental abscess yesterday. Has had one dose of antibiotics. Returned tonight due to increase swelling. No significant pain, no fever.     Triage Assessment     Row Name 07/24/23 0406       Triage Assessment (Adult)    Airway WDL WDL       Respiratory WDL    Respiratory WDL WDL       Skin Circulation/Temperature WDL    Skin Circulation/Temperature WDL WDL       Cardiac WDL    Cardiac WDL WDL       Peripheral/Neurovascular WDL    Peripheral Neurovascular WDL WDL       Cognitive/Neuro/Behavioral WDL    Cognitive/Neuro/Behavioral WDL WDL

## 2023-07-24 NOTE — DISCHARGE INSTRUCTIONS
Continue taking your prescribed Augmentin      Many of these clinics offer a sliding fee option for patients that qualify, and see patients on a walk-in or same day basis. Please call each clinic directly. As services, hours, fees and policies vary greatly.          [facility], [facility]  [phone]  Sees no insurance  [facility], [place]  [phone]  Preventive services only  [facility]  [phone]  Will see for dental emergencies  [facility] (mult loc) [phone]  [facility] [place]  [phone]  [facility], [place]       [phone]  Preventive services only  [facility]  [phone]  Accepts MA & sees no ins  [facility],      Accepts MA & sees no ins   [place]   [phone]; [phone]  [facility]   Accepts MA & sees no ins       [phone]  [facility], [facility]  [phone]   Accepts MA emergencies  [facility] [place] [phone]  [facility],     Accepts MA   [place]   [phone]    [facility] [phone]  Accepts MA & sees no ins   [facility]   Dental Clinic    [phone]  [facility], [facility]  [phone]   [facility] [phone]  [facility]  Preventive services only   [place]   [phone]  [facility] [phone]  [facility], [place]  [phone]  Same day [facility] [phone]  Same day [facility],      Same day [facility]   [phone]    [facility], [facility] [phone]  Free clinic, walk-in only  [facility] (multiple locations) [phone]      [facility] , [facility] [phone]    [facility] [phone]  [facility], walk-in  only  [facility]  [phone]  [facility] [phone] (adults)       [phone] (children)  [place] [facility] [phone]    Also, referral service for low cost dental and healthcare: [phone]  And [phone]

## 2023-07-24 NOTE — ED PROVIDER NOTES
History     Chief Complaint   Patient presents with     Dental Pain     HPI  Cleveland Rodgers is a 68 year old male with history of chronic poor dentition and diabetes presenting for evaluation of a dental abscess.  Was seen yesterday in the clinic and started on Augmentin for this abscess.  Patient reports increasing facial swelling and pain but is only taken 1 dose of his antibiotic.  Denies fevers or chills.  Patient concerned about a worsening infection so came in for evaluation.    Allergies:  Allergies   Allergen Reactions     Cat Hair Extract Itching     asthma     Cats      Other reaction(s): Allergic rhinitis     Fluticasone-Salmeterol Dizziness     Lisinopril Headache       Problem List:    Patient Active Problem List    Diagnosis Date Noted     Alcohol intake above recommended sensible limits 09/28/2022     Priority: Medium     Diabetes mellitus (H) 09/28/2022     Priority: Medium     Hyperlipidemia 09/28/2022     Priority: Medium     Low back pain 09/28/2022     Priority: Medium     Type 2 diabetes mellitus without complications (H) 09/28/2022     Priority: Medium     CPAP (continuous positive airway pressure) dependence 02/28/2018     Priority: Medium     Formatting of this note might be different from the original.  CPAP 17 cmH2O       Cataract 01/24/2017     Priority: Medium     Moderate persistent asthma with acute exacerbation 08/21/2016     Priority: Medium     Morbid obesity (H) 06/03/2013     Priority: Medium     Acute bronchitis 10/05/2008     Priority: Medium     Tobacco use disorder 03/24/2008     Priority: Medium     Essential hypertension 05/02/2006     Priority: Medium     Formatting of this note might be different from the original.  Epic          Past Medical History:    No past medical history on file.    Past Surgical History:    No past surgical history on file.    Family History:    No family history on file.    Social History:  Marital Status:   [2]  Social History     Tobacco  "Use     Smoking status: Every Day     Packs/day: 1.00     Types: Cigarettes     Smokeless tobacco: Never        Medications:    amoxicillin-clavulanate (AUGMENTIN) 875-125 MG tablet  aspirin (ASA) 81 MG chewable tablet  atenolol (TENORMIN) 50 MG tablet  atorvastatin (LIPITOR) 40 MG tablet  budesonide-formoterol (SYMBICORT) 160-4.5 MCG/ACT Inhaler  cetirizine (ZYRTEC) 10 MG tablet  empagliflozin (JARDIANCE) 25 MG TABS tablet  lisinopril (ZESTRIL) 40 MG tablet  predniSONE (DELTASONE) 20 MG tablet          Review of Systems   Constitutional: Negative for appetite change, chills, fatigue and fever.   HENT: Positive for dental problem and facial swelling. Negative for congestion and rhinorrhea.    Respiratory: Negative for cough and shortness of breath.    All other systems reviewed and are negative.      Physical Exam   BP: (!) 130/90  Pulse: 56  Temp: 98.1  F (36.7  C)  Resp: 16  Height: 172.7 cm (5' 8\")  Weight: 132 kg (291 lb)  SpO2: 97 %      Physical Exam  Vitals and nursing note reviewed.   Constitutional:       Appearance: He is obese. He is not ill-appearing or diaphoretic.   HENT:      Head: Atraumatic.      Jaw: There is normal jaw occlusion. No trismus.        Nose: Nose normal.      Mouth/Throat:      Mouth: Mucous membranes are moist.   Eyes:      Conjunctiva/sclera: Conjunctivae normal.   Cardiovascular:      Rate and Rhythm: Normal rate.   Pulmonary:      Effort: Pulmonary effort is normal.   Skin:     General: Skin is warm and dry.      Capillary Refill: Capillary refill takes less than 2 seconds.   Neurological:      Mental Status: He is alert and oriented to person, place, and time.   Psychiatric:         Mood and Affect: Mood normal.         ED [place]    Dental Block    Date/Time: 7/24/2023 6:00 AM    Performed by: Eddie Smith MD  Authorized by: Eddie Smith MD    Risks, benefits and alternatives " discussed.      INDICATIONS     Indications: dental abscess      LOCATION     Anesthesia block type: Infraorbital.    PROCEDURE DETAILS     Topical anesthetic:  Benzocaine gel (In the gumline)    Syringe type:  Aspirating dental syringe    Needle gauge:  27 G    Anesthetic injected:  Bupivacaine 0.5% WITH epi    Injection procedure:  Anatomic landmarks identified, introduced needle, anatomic landmarks palpated and negative aspiration for blood    POST PROCEDURE DETAILS      Outcome:  Anesthesia achieved      PROCEDURE    Patient Tolerance:  Patient tolerated the procedure well with no immediate complications    Performed a I&D of his dental abscess.  Performed 2 incisions along the gumline.  A small amount of purulent fluid as well as bleeding.  Patient tolerated well.              No results found for this or any previous visit (from the past 24 hour(s)).    Medications - No data to display    Assessments & Plan (with Medical Decision Making)  68-year-old male with history of poor dentition and diabetes pending for evaluation of a dental abscess with increasing swelling.  Has taken the first dose of Augmentin but feeling worse overnight so came in for evaluation.  I performed an infraorbital nerve block with good anesthesia and subsequently performed I&D.  Small amount of purulent fluid relieved.  Patient referred to a dentist for definitive management and encouraged to continue taking his antibiotic as prescribed.     I have reviewed the nursing notes.    I have reviewed the findings, diagnosis, plan and need for follow up with the patient.              New Prescriptions    No medications on file       Final diagnoses:   Dental abscess       7/24/2023   [facility] EMERGENCY DEPT     Smith, Eddie Ferreira MD  07/24/23 0604
